# Patient Record
(demographics unavailable — no encounter records)

---

## 2019-06-12 NOTE — REP
OB ULTRASOUND:

 

Real-time sonographic evaluation of the gravid uterus is performed.

 

There is a single living intrauterine gestation, estimated gestational age 29

weeks 3 days, EDC 08/25/2019. Today's measurements indicate appropriate growth.

 

BPD 75 mm = 30 weeks 1 day, 60th percentile

 

 mm = 29 weeks 6 days, 61st percentile

 

 mm = 31 weeks 2 days, 77th percentile

 

FL 57 mm = 29 weeks 6 days, 59th percentile

 

HC/AC ratio 1.01, within normal range. Estimated fetal weight 1596 grams, 69th

percentile. Cervix is closed and measures 5.4 cm in length. Fetal heart rate 155

beats per minute. Amniotic fluid within normal limits, CINDY 15.5, with a normal

range of 9.1 to 23.2. S/D ratio 2.55 and RI 0.61, within normal range.

 

 

 

                                       SEEN/GROSSLY UNREMARKABLE

 

Lateral ventricles         yes

 

Posterior fossa            yes

 

Upper lip                  yes

 

Four-chamber heart         yes

 

LVOT                       yes

 

RVOT                       yes

 

Stomach                    yes

 

Cord insertion             yes

 

Three vessel cord          yes

 

Kidneys                    yes

 

Bladder                    yes

 

Spine                      yes

 

Fetal position:      Vertex.

 

Placenta:        Posterior and to the right, grade 0 with no previa or

abruption.

 

 

 

 

Electronically Signed by

Tiago Villalta MD 06/13/2019 04:25 P